# Patient Record
Sex: FEMALE | Race: WHITE | Employment: FULL TIME | URBAN - METROPOLITAN AREA
[De-identification: names, ages, dates, MRNs, and addresses within clinical notes are randomized per-mention and may not be internally consistent; named-entity substitution may affect disease eponyms.]

---

## 2017-02-28 ENCOUNTER — OFFICE VISIT (OUTPATIENT)
Dept: OBGYN CLINIC | Facility: CLINIC | Age: 32
End: 2017-02-28

## 2017-02-28 VITALS — WEIGHT: 214 LBS | DIASTOLIC BLOOD PRESSURE: 86 MMHG | SYSTOLIC BLOOD PRESSURE: 136 MMHG | BODY MASS INDEX: 34 KG/M2

## 2017-02-28 DIAGNOSIS — R10.2 PELVIC PAIN: Primary | ICD-10-CM

## 2017-02-28 PROCEDURE — 99212 OFFICE O/P EST SF 10 MIN: CPT | Performed by: OBSTETRICS & GYNECOLOGY

## 2017-02-28 RX ORDER — ESTAZOLAM 2 MG/1
TABLET ORAL
Refills: 2 | COMMUNITY
Start: 2017-02-27 | End: 2018-01-19

## 2017-03-01 NOTE — PROGRESS NOTES
GYN H&P     2/28/2017  6:28 PM    CC: Patient presents with: Other: Patient here for follow up, had missed ab 11/2015, never had a follow up appt.  Today c/o painful menses while on OCP.      HPI: patient is a 33year old [id number] here for Patient presents Social History Main Topics   Smoking status: Current Every Day Smoker  0.40 Packs/Day     Smokeless tobacco: Not on file    Alcohol Use: No    Drug Use: No    Sexual Activity: Not on file   Not on file  Other Topics Concern    Caffeine Concern Yes    C

## 2017-03-20 ENCOUNTER — HOSPITAL ENCOUNTER (OUTPATIENT)
Age: 32
Discharge: HOME | End: 2017-03-20
Attending: FAMILY MEDICINE
Payer: COMMERCIAL

## 2017-03-20 ENCOUNTER — APPOINTMENT (OUTPATIENT)
Dept: CT IMAGING | Age: 32
End: 2017-03-20
Attending: FAMILY MEDICINE
Payer: COMMERCIAL

## 2017-03-20 VITALS
TEMPERATURE: 98 F | HEART RATE: 88 BPM | OXYGEN SATURATION: 100 % | HEIGHT: 68 IN | BODY MASS INDEX: 31.83 KG/M2 | DIASTOLIC BLOOD PRESSURE: 77 MMHG | RESPIRATION RATE: 18 BRPM | SYSTOLIC BLOOD PRESSURE: 121 MMHG | WEIGHT: 210 LBS

## 2017-03-20 DIAGNOSIS — S09.90XA HEAD INJURY, INITIAL ENCOUNTER: ICD-10-CM

## 2017-03-20 DIAGNOSIS — S05.12XA PERIORBITAL CONTUSION OF LEFT EYE, INITIAL ENCOUNTER: ICD-10-CM

## 2017-03-20 DIAGNOSIS — S00.83XA FOREHEAD CONTUSION, INITIAL ENCOUNTER: Primary | ICD-10-CM

## 2017-03-20 LAB — POCT URINE PREGNANCY: NEGATIVE

## 2017-03-20 PROCEDURE — 99214 OFFICE O/P EST MOD 30 MIN: CPT

## 2017-03-20 PROCEDURE — 99204 OFFICE O/P NEW MOD 45 MIN: CPT

## 2017-03-20 PROCEDURE — 70450 CT HEAD/BRAIN W/O DYE: CPT

## 2017-03-20 PROCEDURE — 81025 URINE PREGNANCY TEST: CPT | Performed by: FAMILY MEDICINE

## 2017-03-20 RX ORDER — IBUPROFEN 200 MG
200 TABLET ORAL EVERY 6 HOURS PRN
COMMUNITY

## 2017-03-20 NOTE — ED INITIAL ASSESSMENT (HPI)
Pt. Tripped last night about 10pm on shoe/stick for patio door. Jurline Orts into a water cooler. Hit front of head & left side above the eye. Denies LOC, denies nausea. C/o bruising to left outer eye area, fluid filled area to forehead.  Feels a little off

## 2017-03-21 NOTE — ED PROVIDER NOTES
Patient Seen in: THE [facility] OF [facility] [place]    History   Patient presents with:  Head Injury  Headache  Laceration Abrasion (integumentary)    Stated Complaint: HEAD INJURY     HPI  32 yo F here with head injury   Injury happened at 10 PM yeste vital signs reviewed. All other systems reviewed and negative except as noted above. PSFH elements reviewed from today and agreed except as otherwise stated in HPI.     Physical Exam       ED Triage Vitals   BP 03/20/17 1745 145/78 mmHg   Pulse 03/2 normal  Sensory: normal  Motor:grossly normal  Reflexes: 2+ and symmetric  Coordination: normal  Gait: Normal    ED Course     Labs Reviewed   POCT PREGNANCY, URINE - Normal       Orders Placed This Encounter  CT BRAIN OR HEAD (86925) Once  Order Comments: ischemia. It does not appear acute. No cortical swelling. There is nothing specific for acute infarct. There is no hemorrhage or mass lesion.   SINUSES:           Partially seen right maxillary sinus has some fluid layering within the maxillary sinus MASTO Medications Prescribed:  Discharge Medication List as of 3/20/2017  7:38 PM

## 2017-04-22 ENCOUNTER — HOSPITAL ENCOUNTER (OUTPATIENT)
Dept: ULTRASOUND IMAGING | Age: 32
Discharge: HOME | End: 2017-04-22
Attending: OBSTETRICS & GYNECOLOGY
Payer: COMMERCIAL

## 2017-04-22 DIAGNOSIS — R10.2 PELVIC PAIN: ICD-10-CM

## 2017-04-22 PROCEDURE — 76830 TRANSVAGINAL US NON-OB: CPT

## 2017-04-22 PROCEDURE — 76856 US EXAM PELVIC COMPLETE: CPT

## 2017-04-27 ENCOUNTER — TELEPHONE (OUTPATIENT)
Dept: OBGYN CLINIC | Facility: CLINIC | Age: 32
End: 2017-04-27

## 2018-01-19 ENCOUNTER — TELEPHONE (OUTPATIENT)
Dept: OBGYN CLINIC | Facility: CLINIC | Age: 33
End: 2018-01-19

## 2018-01-19 RX ORDER — ESTAZOLAM 2 MG/1
1 TABLET ORAL DAILY
Qty: 1 PACKAGE | Refills: 0 | Status: SHIPPED | OUTPATIENT
Start: 2018-01-19 | End: 2018-02-15

## 2018-01-19 NOTE — TELEPHONE ENCOUNTER
Patient will be out of birth control in two weeks. She would like a refill of only 1 month.  She will discuss a change in medication at her annual [facility]

## 2018-01-19 NOTE — TELEPHONE ENCOUNTER
Patient was last seen 02/2017 for pelvic pain. No documentation of patient having annual.  Had missed ab in 2015. She has pending appt 02/21/2018. Is requesting one refill of OCP until that time. Please advise if OK to send.

## 2018-02-15 RX ORDER — ESTAZOLAM 2 MG/1
1 TABLET ORAL DAILY
Qty: 21 TABLET | Refills: 0 | Status: SHIPPED | OUTPATIENT
Start: 2018-02-15

## 2018-03-08 NOTE — TELEPHONE ENCOUNTER
Pt last seen 2/2017. Pt needs appt for further refills. Please schedule and route back to RN for refill.

## 2018-03-20 RX ORDER — ESTAZOLAM 2 MG/1
1 TABLET ORAL DAILY
Qty: 21 TABLET | Refills: 0 | OUTPATIENT
Start: 2018-03-20

## 2019-01-29 ENCOUNTER — WALK IN (OUTPATIENT)
Dept: URGENT CARE | Age: 34
End: 2019-01-29

## 2019-01-29 DIAGNOSIS — J02.9 ACUTE PHARYNGITIS, UNSPECIFIED ETIOLOGY: ICD-10-CM

## 2019-01-29 DIAGNOSIS — R68.89 FLU-LIKE SYMPTOMS: ICD-10-CM

## 2019-01-29 DIAGNOSIS — J20.8 ACUTE BRONCHITIS, VIRAL: Primary | ICD-10-CM

## 2019-01-29 LAB
FLUAV AG UPPER RESP QL IA.RAPID: NEGATIVE
FLUBV AG UPPER RESP QL IA.RAPID: NEGATIVE
INTERNAL PROCEDURAL CONTROLS ACCEPTABLE: YES
S PYO AG THROAT QL IA.RAPID: NEGATIVE

## 2019-01-29 PROCEDURE — 87804 INFLUENZA ASSAY W/OPTIC: CPT | Performed by: NURSE PRACTITIONER

## 2019-01-29 PROCEDURE — 99203 OFFICE O/P NEW LOW 30 MIN: CPT | Performed by: NURSE PRACTITIONER

## 2019-01-29 PROCEDURE — 87880 STREP A ASSAY W/OPTIC: CPT | Performed by: NURSE PRACTITIONER

## 2019-01-29 RX ORDER — BENZONATATE 100 MG/1
CAPSULE ORAL
Qty: 30 CAPSULE | Refills: 0 | Status: SHIPPED | OUTPATIENT
Start: 2019-01-29

## 2019-01-29 RX ORDER — ALBUTEROL SULFATE 90 UG/1
AEROSOL, METERED RESPIRATORY (INHALATION)
Qty: 1 INHALER | Refills: 0 | Status: SHIPPED | OUTPATIENT
Start: 2019-01-29

## 2019-01-29 RX ORDER — OSELTAMIVIR PHOSPHATE 75 MG/1
75 CAPSULE ORAL 2 TIMES DAILY
Qty: 10 CAPSULE | Refills: 0 | Status: SHIPPED | OUTPATIENT
Start: 2019-01-29 | End: 2019-02-03

## 2019-01-29 ASSESSMENT — ENCOUNTER SYMPTOMS
SEIZURES: 0
HEADACHES: 0
SORE THROAT: 1
LIGHT-HEADEDNESS: 0
ACTIVITY CHANGE: 0
RHINORRHEA: 0
SINUS PAIN: 0
STRIDOR: 0
SHORTNESS OF BREATH: 0
WHEEZING: 0
COUGH: 1
DIZZINESS: 0
FEVER: 1
APPETITE CHANGE: 1

## 2019-01-29 ASSESSMENT — PAIN SCALES - GENERAL: PAINLEVEL: 0

## 2021-05-26 VITALS
WEIGHT: 230 LBS | HEIGHT: 69 IN | TEMPERATURE: 98.3 F | OXYGEN SATURATION: 97 % | HEART RATE: 85 BPM | RESPIRATION RATE: 18 BRPM | BODY MASS INDEX: 34.07 KG/M2

## (undated) NOTE — MR AVS SNAPSHOT
[address], [facility] [address] [place] [address] [phone]               Thank you for choosing us for your health care visit with Amadou Pierson MD.  We are glad to serve you and happy to provide you with this summar Summaries. If you've been to the Emergency Department or your doctor's office, you can view your past visit information in [employer] by going to Visits < Visit Summaries. [employer] questions? Call [phone] for help.   [employer] is NOT to be used for urge

## (undated) NOTE — LETTER
[facility] IN [place]  [address] [address]  Dept: [phone]  Dept Fax: [phone]         March 20, 2017    Patient: Arcelia Holly   YOB: 1985   Date of Visit: 3/20/2017       To Whom It May Concern:

## (undated) NOTE — ED AVS SNAPSHOT
[facility] in [address]    [address] [facility]    Phone:  [phone]    Fax:  Dinah Trentnataly Caio   MRN: [id number]    Department:  THE [facility] OF [facility] in [address] [place] [address],[address]   Date of Visit:  3/20/2017 [phone]       To Check ER Wait Times:  TEXT 'ERwait' to [id number]      Click [email or web address]      Or call [phone]    If you have any problems with your follow-up, please call our  at [phone].     Francisco J Snowden con I have read and understand the instructions given to me by my caregivers. 24-Hour Pharmacies        Pharmacy Address Phone Number   [facility] [phone] ([address]) Yumiko Tomlin skull fracture. No acute intracranial bleed. Small old appearing low attenuation subcortical right frontal lobe. Partially seen right maxillary sinus contains moderate fluid.            Dictated by: Nataliya Paul MD on 3/20/2017 at 19:15       Approved by: OTHER:             Soft tissue swelling seen in the left periorbital soft tissues consistent with contusion. No rupture of the globe or sign of retrobulbar hematoma.              MyChart     Visit MyChart  You can access your MyChart to more actively manage

## (undated) NOTE — LETTER
[facility] IN [place]  [address] [address]  Dept: [phone]  Dept Fax: [phone]      March 20, 2017    Patient: Nuria Godinez   Date of Visit: 3/20/2017       To Whom It May Concern:    Lucio Brian was seen and tr